# Patient Record
Sex: FEMALE | Race: WHITE | URBAN - METROPOLITAN AREA
[De-identification: names, ages, dates, MRNs, and addresses within clinical notes are randomized per-mention and may not be internally consistent; named-entity substitution may affect disease eponyms.]

---

## 2017-06-07 ENCOUNTER — TELEPHONE (OUTPATIENT)
Dept: BEHAVIORAL HEALTH | Facility: CLINIC | Age: 52
End: 2017-06-07

## 2017-06-07 NOTE — TELEPHONE ENCOUNTER
S:  6/7/17 The client called requesting a gambling evaluation for OP TX  B:  The client stated the Internet is her method of use. She stated it has  Gotten worse over the past 6 to 7 months.  She denies Previous   gambling TX, and denies substance abuse, denies legal Issues.   Hx of secrecy  A:  Gambling evaluation   Hx of Depression/Anxiety, prescribed Effexor, Abilify and Ativan;   the client stated since starting Abilify her gambling has gotten worse,   and she has discontinued it AMA, but stated she has an appointment   to see the prescribing doctor to discuss the situation. The client   denies substance abuse and denies previous gambling TX  R:  The client stated she talk to Barb about an appointment on 6/9/17   at 1:00 pm, but there is not slot available. Left VM for Barb to contact   [facility] about a 1:00 pm appointment, and will follow up with client to   Scheduled. The client provided her cell phone number to call, but she did   not want any message left on phone about gambling, she stated she did   not want her  to know at this time, and that she will talk to Him   about it Sent for bens. EWA

## 2017-06-08 NOTE — TELEPHONE ENCOUNTER
6/8/17 Received call from Barb (Gambling) Ok to schedule for   6/9/17 @ 1:00 pm, has Open up slot. Left  for client to call [facility]. BILLIET

## 2021-05-30 ENCOUNTER — RECORDS - HEALTHEAST (OUTPATIENT)
Dept: ADMINISTRATIVE | Facility: CLINIC | Age: 56
End: 2021-05-30